# Patient Record
(demographics unavailable — no encounter records)

---

## 2024-12-06 NOTE — DISCUSSION/SUMMARY
[FreeTextEntry1] : Annual exam  UTD with PAp smear Birad 3 breast iaging, pt informed advised to schedule breast us and mammogram  Dysuria, UA, CS GC, Chl, Rx macrobid Pelvic pain, pelvic us  Screening colonsocopy, GI refrral  f/u 1 year/PRN  PHQ-9 Patient screened for depression - no signs of clinical depression. PHQ-9 scores reviewed over the course of the visit 5-10minutes of fac to face time. Follow up with changes in mood including other symptoms of anxiety. Scor e0

## 2025-02-13 NOTE — REVIEW OF SYSTEMS
[Fatigue] : fatigue [As Noted in HPI] : as noted in HPI [Headaches] : headaches [Negative] : Heme/Lymph [All other systems negative] : All other systems negative

## 2025-02-13 NOTE — REASON FOR VISIT
[Follow - Up] : a follow-up visit [Pituitary Evaluation/ Disorder] : pituitary evaluation/disorder [Home] : at home, [unfilled] , at the time of the visit. [Other Location: e.g. Home (Enter Location, City,State)___] : at [unfilled] [Patient] : the patient

## 2025-02-17 NOTE — HISTORY OF PRESENT ILLNESS
[FreeTextEntry1] : Ms. 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 She was undergoing fertility treatment and was found to have an elevated prolactin level of 42. MRI brain subsequently showed pituitary adenoma left side of the pituitary 1.2cm. The tumor was not a prolactinoma, prolactin level was elevated due to stalk effect. Pathology stained positive for prolactin, FSH (weak) and ACTH (focal), but negative for TSH. Occasional tumor cells are positive for LH and GH. The Ki-67 labeling index is 2-4%. 1-2% tumor cell nuclei are positive for p53. Of note, in 2017 she was prescribed cabergoline for elevated prolactin for 1 week. The following year she got pregnant and miscarried. Pt had blurry vision and headaches prior to surgery. Was not evaluated by opthal and has not seen an endocrinologist. She was evaluated by neuro-opthal post surgery. Pt had ACTH stim test in July 2021 which was normal, baseline cortisol was 16.6, 30 minutes was 22, 60 minutes was 25.6.   Labs June 22, 2022:  FT4 1.1 on levothyroxine 50mcg QD.  AM cortisol 17.7.  Pt dizziness has improved.  Saw neuro-opthal May 2022, wnl.  MRI pituitary July 2022 Stable lesion involving the left cerebellar region is seen with hemosiderin rim. This is compatible with patient's known cavernous angioma. This lesion measures approximately 1.0 x 0.9 cm and previously measured approximately 1.2 x 0.9 cm. July 2022 was last pituitary MRI, wnl. She was discharged from NSGY team.    Today:  Labs reviewed with patient.  Denies excess thirst and urination

## 2025-02-17 NOTE — ASSESSMENT
[Levothyroxine] : The patient was instructed to take Levothyroxine on an empty stomach, separate from vitamins, and wait at least 30 minutes before eating [FreeTextEntry1] : 46 year old female with PMH of interstitial cystitis with bladder installations, PCOS, h/o fertility treatment, HLD s/p endoscopic endonasal gross total resection of a 1.2cm pituitary macroadenoma on june 3rd, presenting for follow up.  1. H/o pituitary macroadenoma resection Pt has no s/s of diabetes insipidus. Discussed the symptoms of DI, excessive thirst and urination which patient denies. Advised that if it happens, she should call me. No signs of secondary AI. Continue Brand synthroid 75mcg QD MRI pituitary reviewed from July 2022.  Repeat MRI 2025.   2. HLD LDL increased to 147 C/w lipitor 10mg HS  Patient verbalized understanding of the above. All questions were answered to patient's satisfaction. Dispo: Patient will follow up in 6 months.

## 2025-02-17 NOTE — DATA REVIEWED
[FreeTextEntry1] : EXAM:  MR BRAIN WAW IC                      \par  PROCEDURE DATE:  06/05/2021  \par  INTERPRETATION:  Clinical indications: Status post surgery.\par  \par  MRI of the brain was performed using sagittal T1, axial SPGR, T2 T2 FLAIR diffusion and susceptibility weighted sequence. The patient was injected with approximately 7.5 cc of Gadavist IV and sagittal coronal and axial T1-weighted sequences were performed.\par  \par  This exam is compared with prior brain MRI performed on June 5, 2021.\par  \par  Postop changes compatible transphenoidal surgery is again seen.\par  \par  Area of decreased enhancement involving the central to left side of the pituitary gland is identified. This could be related postop changes though the possibility residual tumor in this region cannot entirely excluded. Clinical correlation continued close and [place] recommended.\par  \par  This area of decreased enhancement measures approximately 9.1 x 5.0 mm.\par  \par  The pituitary gland measures approximately 8.8 mm maximum height which is within normal limits.\par  \par  Air-fluid level seen involving the posterior ethmoid and posterior nasal cavity region. Opacification of both sphenoid sinuses are seen which is related postop changes.\par  \par  Cavernous angioma involving left cerebellar region is again identified. This finding measures approximately 10 x 9.6 mm.\par  \par  The large vessels and trait normal flow voids\par  \par  Evaluation of the diffusion weighted sequence demonstrates no abnormal areas of restricted diffusion to suggest acute infarct.\par  \par  The large vessels demonstrate normal flow voids\par  \par  Air-fluid level seen involving both maxillary sinuses with mucosal thickening. Bilateral ethmoid and left frontal sinus mucosal thickening is seen. Both mastoid and middle ear regions appear clear.\par  \par  IMPRESSION: Postop changes as described above. Left cerebellar cavernous angioma again seen.\par  \par  \par   EXAM:  CT BRAIN                      \par  \par  \par  PROCEDURE DATE:  06/07/2021  \par  \par  \par  \par  \par  INTERPRETATION:  INDICATIONS:  transphenoidal hypophysectomy. Follow-up study\par  \par  TECHNIQUE:  Serial axial images were obtained from the skull base to the vertex without intravenous contrast. Sagittal and Coronal reformats were performed\par  \par  COMPARISON EXAMINATION: 6/4/2021 7:53 AM and brain MR 6/5/2021\par  \par  FINDINGS:\par  VENTRICLES AND SULCI:  Normal.\par  INTRA-AXIAL:  Again appreciated is the high attenuation focus left cerebellar hemisphere consistent with the patient's known cavernous malformation as seen previously. No evidence of acute hemorrhage in association with this lesion.\par  EXTRA-AXIAL:  Status post transsphenoidal surgery with fat packing appreciated at the level of the sella. Residual pituitary tissue is seen at the level of the gland. ..\par  VISUALIZED SINUSES:  Postop changes at the level of the ethmoid and sphenoid sinuses\par  VISUALIZED MASTOIDS:  Clear.\par  CALVARIUM:  Normal.\par  MISCELLANEOUS:  None.\par  \par  IMPRESSION:  Evolution of postop changes compared with the prior 6/4/2021. Evidence of transsphenoidal hypophysis ectomy with postop changes at the level of the sphenoid and ethmoid sinus regions. Evidence of high attenuation left cerebellar hemisphere consistent with patient's known cavernous malformation at this level.\par  \par  Surgical Final Report\par  Final Diagnosis\par  \par  1. Pituitary adenoma, excision:\par  \par  Comment:\par  Immunostains are performed on block 1A and show tumor cells are positive for prolactin, FSH (weak) and ACTH (focal), but negative for TSH. Occasional tumor cells are positive for LH and GH. The Ki-67 labeling index is 2-4%. 1-2% tumor cell nuclei are positive for p53.\par  \par  Labs: \par  Prolactin 42 --> 4.5 \par  ACTH 8.4 \par  LF 11.9 \par  FSH 9.2 \par  Random urine sodium 295, 441. \par

## 2025-02-17 NOTE — DATA REVIEWED
[FreeTextEntry1] : EXAM:  MR BRAIN WAW IC                      \par  PROCEDURE DATE:  06/05/2021  \par  INTERPRETATION:  Clinical indications: Status post surgery.\par  \par  MRI of the brain was performed using sagittal T1, axial SPGR, T2 T2 FLAIR diffusion and susceptibility weighted sequence. The patient was injected with approximately 7.5 cc of Gadavist IV and sagittal coronal and axial T1-weighted sequences were performed.\par  \par  This exam is compared with prior brain MRI performed on June 5, 2021.\par  \par  Postop changes compatible transphenoidal surgery is again seen.\par  \par  Area of decreased enhancement involving the central to left side of the pituitary gland is identified. This could be related postop changes though the possibility residual tumor in this region cannot entirely excluded. Clinical correlation continued close and [place] recommended.\par  \par  This area of decreased enhancement measures approximately 9.1 x 5.0 mm.\par  \par  The pituitary gland measures approximately 8.8 mm maximum height which is within normal limits.\par  \par  Air-fluid level seen involving the posterior ethmoid and posterior nasal cavity region. Opacification of both sphenoid sinuses are seen which is related postop changes.\par  \par  Cavernous angioma involving left cerebellar region is again identified. This finding measures approximately 10 x 9.6 mm.\par  \par  The large vessels and trait normal flow voids\par  \par  Evaluation of the diffusion weighted sequence demonstrates no abnormal areas of restricted diffusion to suggest acute infarct.\par  \par  The large vessels demonstrate normal flow voids\par  \par  Air-fluid level seen involving both maxillary sinuses with mucosal thickening. Bilateral ethmoid and left frontal sinus mucosal thickening is seen. Both mastoid and middle ear regions appear clear.\par  \par  IMPRESSION: Postop changes as described above. Left cerebellar cavernous angioma again seen.\par  \par  \par   EXAM:  CT BRAIN                      \par  \par  \par  PROCEDURE DATE:  06/07/2021  \par  \par  \par  \par  \par  INTERPRETATION:  INDICATIONS:  transphenoidal hypophysectomy. Follow-up study\par  \par  TECHNIQUE:  Serial axial images were obtained from the skull base to the vertex without intravenous contrast. Sagittal and Coronal reformats were performed\par  \par  COMPARISON EXAMINATION: 6/4/2021 7:53 AM and brain MR 6/5/2021\par  \par  FINDINGS:\par  VENTRICLES AND SULCI:  Normal.\par  INTRA-AXIAL:  Again appreciated is the high attenuation focus left cerebellar hemisphere consistent with the patient's known cavernous malformation as seen previously. No evidence of acute hemorrhage in association with this lesion.\par  EXTRA-AXIAL:  Status post transsphenoidal surgery with fat packing appreciated at the level of the sella. Residual pituitary tissue is seen at the level of the gland. ..\par  VISUALIZED SINUSES:  Postop changes at the level of the ethmoid and sphenoid sinuses\par  VISUALIZED MASTOIDS:  Clear.\par  CALVARIUM:  Normal.\par  MISCELLANEOUS:  None.\par  \par  IMPRESSION:  Evolution of postop changes compared with the prior 6/4/2021. Evidence of transsphenoidal hypophysis ectomy with postop changes at the level of the sphenoid and ethmoid sinus regions. Evidence of high attenuation left cerebellar hemisphere consistent with patient's known cavernous malformation at this level.\par  \par  Surgical Final Report\par  Final Diagnosis\par  \par  1. Pituitary adenoma, excision:\par  \par  Comment:\par  Immunostains are performed on block 1A and show tumor cells are positive for prolactin, FSH (weak) and ACTH (focal), but negative for TSH. Occasional tumor cells are positive for LH and GH. The Ki-67 labeling index is 2-4%. 1-2% tumor cell nuclei are positive for p53.\par  \par  Labs: \par  Prolactin 42 --> 4.5 \par  ACTH 8.4 \par  LF 11.9 \par  FSH 9.2 \par  Random urine sodium 295, 441. \par

## 2025-03-04 NOTE — REASON FOR VISIT
[CV Risk Factors and Non-Cardiac Disease] : CV risk factors and non-cardiac disease [Hyperlipidemia] : hyperlipidemia [FreeTextEntry3] : Dr. Barrera [FreeTextEntry1] : This is a 46  year old female with past medical history significant for hypercholesterolemia, hypothyroidism, status post removal of a pituitary adenoma, status post uterine fibroid removal, who comes in for lipid follow-up evaluation. She denies chest pain, dizziness or episodes of syncope.   Her cardiac risk factors include hypercholesterolemia.  (She reports that her father had hypercholesterolemia, hypertension,  at age 72). Lipid panel done 2023, demonstrated cholesterol 282, triglycerides 190 mg/dL,HDL 87, LDL calculated 157 mg/dL and non-HDL cholesterol 195 mg/dL Patient follows up with endocrinologist who started patient on Atorvastatin 10 mg. patient will follow up with Endo in July.  Transthoracic echo done on 2023, reveals physiologic mitral regurgitation, mild tricuspid regurgitation, physiologic pulmonic regurgitation.

## 2025-03-04 NOTE — PHYSICAL EXAM
[Well Developed] : well developed [Well Nourished] : well nourished [No Acute Distress] : no acute distress [Normal Conjunctiva] : normal conjunctiva [Normal Venous Pressure] : normal venous pressure [No Carotid Bruit] : no carotid bruit [Normal S1, S2] : normal S1, S2 [No Rub] : no rub [No Gallop] : no gallop [5th Left ICS - MCL] : palpated at the 5th LICS in the midclavicular line [Normal] : normal [No Precordial Heave] : no precordial heave was noted [Normal Rate] : normal [Rhythm Regular] : regular [Normal S1] : normal S1 [Normal S2] : normal S2 [I] : a grade 1 [No Pitting Edema] : no pitting edema present [2+] : left 2+ [No Abnormalities] : the abdominal aorta was not enlarged and no bruit was heard [Clear Lung Fields] : clear lung fields [Good Air Entry] : good air entry [No Respiratory Distress] : no respiratory distress  [Soft] : abdomen soft [Non Tender] : non-tender [No Masses/organomegaly] : no masses/organomegaly [Normal Bowel Sounds] : normal bowel sounds [Normal Gait] : normal gait [No Edema] : no edema [No Cyanosis] : no cyanosis [No Clubbing] : no clubbing [No Varicosities] : no varicosities [No Rash] : no rash [No Skin Lesions] : no skin lesions [Moves all extremities] : moves all extremities [No Focal Deficits] : no focal deficits [Normal Speech] : normal speech [Alert and Oriented] : alert and oriented [Normal memory] : normal memory [Apical Thrill] : no thrill palpable at the apex [S3] : no S3 [S4] : no S4 [Right Carotid Bruit] : no bruit heard over the right carotid [Left Carotid Bruit] : no bruit heard over the left carotid [Right Femoral Bruit] : no bruit heard over the right femoral artery [Left Femoral Bruit] : no bruit heard over the left femoral artery

## 2025-03-04 NOTE — DISCUSSION/SUMMARY
[FreeTextEntry1] : This is a 46-year-old female with past medical history significant for hypercholesterolemia, hypothyroidism, status post removal of a pituitary adenoma, status post uterine fibroid removal, who comes in for lipid/cardiac follow-up evaluation. She denies chest pain, shortness of breath, dizziness or syncope.  She was born in [facility] and has no history of rheumatic fever.  She does not drink excessive caffeine or alcohol. Her cardiac risk factors include hypercholesterolemia.  (She reports that her father had hypercholesterolemia, hypertension,  at age 72). Electrocardiogram done 2025 demonstrated normal sinus rhythm rate 61 bpm and is otherwise unremarkable. She will remain on her current dose of Lipitor 10 mg daily. She will have new blood work done today for lipid panel cholesterol 259, triglycerides 214, HDL of 73, LDL calculated 147 mg/dL. Further recommendations regarding her Lipitor dosage will be determined based on the results of her blood work from today. Lipid panel done 2024 demonstrated hemoglobin A1c of 5.6, Electrocardiogram done 2023 demonstrates normal sinus rhythm at a rate of 60 bpm is otherwise unremarkable. The patient has been stable on Lipitor 10 mg daily. Lipid panel done 2023 demonstrated cholesterol 188, triglycerides 157, HDL of 86, LDL calculated 70 mg/dL and non-HDL cholesterol 101 mg/dL. The patient will continue on her current dose of Lipitor therapy.  She will follow-up with her endocrinologist regarding her hypothyroidism.  She will continue on her current diet and exercise program. The patient had a normal exercise stress test 2023. She reports that her endocrinologist started her on Lipitor 10 mg daily. Lipid panel done 2022 demonstrated cholesterol 296, triglycerides 110 mg/dL,, LDL calculated 166 mg/dL and non-HDL cholesterol 188 mg/dL In the meantime I recommended she work with a registered dietitian. The patient understands that aerobic exercises must be increased to 40 minutes 4 times per week. A detailed discussion of lifestyle modification was done today. The patient has a good understanding of the diagnosis, and treatment plan. Lifestyle modification was also outlined.  Thank you for allowing me to participate in care of your patient.  Please do not hesitate to call if you have any questions.

## 2025-05-02 NOTE — HISTORY OF PRESENT ILLNESS
[FreeTextEntry1] : Patient with history of IC/BPS, recurring urinary tract infections with prior episode of pyelonephritis, pituitary adenoma..   Currently still off antibiotic prophylaxis and currently asymptomatic with only occasional episodes of dysuria.   Pain score is 1/10 which is better than all previous office visits. Occ left flank pain.  Last documented UTI May 2024 with pansensitive E. coli.   CT scan, [facility] showed 2 mm right interpolar nonobstructing calculus with left-sided renal cortical scarring. Cystoscopy October 2023 showed no abnormalities.   Still with occasional episodes of urinary stress incontinence. Kegel exercises and timed voiding have been helpful. Does not wish to proceed with any more aggressive therapy. Suggested role for topical estrogen.  A topical estrogen might be helpful in terms of UTI and even urinary incontinence. With reference to recurrent urinary tract infection, patient will institute self therapy protocol with trimethoprim/sulfamethoxazole.  In summary, Eldon is doing quite well urologically.  Management of stress urinary incontinence and recurring urinary tract infections as noted above.  Suggested follow-up renal ultrasound and will call for results.

## 2025-06-10 NOTE — ASSESSMENT
[FreeTextEntry1] : Impression: History of iron deficiency anemia although recent CBCs appear to be normal.  Recent iron, TIBC, ferritin all normal.  Calculated transferrin saturation slightly low at 11% but this is of no significance in the face of normal serum iron, TIBC, ferritin, and CBC.  Do not suspect GI blood loss.  Cologuard test includes occult blood/FIT and is negative.  Low iron most likely secondary to past history of menorrhagia and has responded to replacement which rules out malabsorption.  Patient has a history of H. pylori which reportedly was successfully eradicated in the past.  No GI complaints currently.  Plan: For the sake of completeness we will perform a colonoscopy just to achieve more definitive screening/rule out false negative Cologuard.  Will do EGD as well in light of past history of H. pylori.  Do not anticipate the need for a small bowel capsule endoscopy as GI etiology of anemia is not suspected.

## 2025-06-10 NOTE — HISTORY OF PRESENT ILLNESS
[FreeTextEntry1] : 46-year-old female referred for GI workup for iron deficiency.  Patient states she has a history of iron deficiency anemia and has been on and off of iron for the past 5 years or so.  History of menorrhagia secondary to uterine fibroids.  Had surgery for fibroids and now perimenopausal so menstrual period is less frequent and not particularly heavy.  No history of melena hematochezia or bright red blood per rectum.  No family history of colon cancer or polyps.  Has not yet had a colonoscopy.  Cologuard done last month is negative.  Patient had an EGD around 2019 and was diagnosed with H. pylori.  She was treated and subsequent stool test reportedly negative.

## 2025-06-10 NOTE — PHYSICAL EXAM
[Alert] : alert [Normal Voice/Communication] : normal voice/communication [Healthy Appearing] : healthy appearing [No Acute Distress] : no acute distress [Sclera] : the sclera and conjunctiva were normal [Hearing Threshold Finger Rub Not [place]] : hearing was normal [Normal Lips/Gums] : the lips and gums were normal [Oropharynx] : the oropharynx was normal [Normal Appearance] : the appearance of the neck was normal [No Neck Mass] : no neck mass was observed [No Respiratory Distress] : no respiratory distress [No Acc Muscle Use] : no accessory muscle use [Respiration, Rhythm And Depth] : normal respiratory rhythm and effort [Heart Rate And Rhythm] : heart rate was normal and rhythm regular [Auscultation Breath Sounds / Voice Sounds] : lungs were clear to auscultation bilaterally [Bowel Sounds] : normal bowel sounds [Murmurs] : no murmurs [Normal S1, S2] : normal S1 and S2 [Abdomen Tenderness] : non-tender [Abdomen Soft] : soft [No Masses] : no abdominal mass palpated [Oriented To Time, Place, And Person] : oriented to person, place, and time [] : no hepatosplenomegaly